# Patient Record
Sex: MALE | Race: WHITE | URBAN - METROPOLITAN AREA
[De-identification: names, ages, dates, MRNs, and addresses within clinical notes are randomized per-mention and may not be internally consistent; named-entity substitution may affect disease eponyms.]

---

## 2020-11-04 ENCOUNTER — APPOINTMENT (OUTPATIENT)
Dept: OTHER | Facility: HOSPITAL | Age: 35
End: 2020-11-04
Attending: EMERGENCY MEDICINE

## 2021-10-26 DIAGNOSIS — Z00.00 ROUTINE GENERAL MEDICAL EXAMINATION AT A HEALTH CARE FACILITY: Primary | ICD-10-CM

## 2021-10-28 ENCOUNTER — LAB ENCOUNTER (OUTPATIENT)
Dept: LAB | Facility: HOSPITAL | Age: 36
End: 2021-10-28
Attending: PREVENTIVE MEDICINE

## 2021-10-28 DIAGNOSIS — Z00.00 ROUTINE GENERAL MEDICAL EXAMINATION AT A HEALTH CARE FACILITY: ICD-10-CM

## 2021-11-01 ENCOUNTER — LAB ENCOUNTER (OUTPATIENT)
Dept: LAB | Facility: HOSPITAL | Age: 36
End: 2021-11-01
Attending: PREVENTIVE MEDICINE

## 2021-11-01 DIAGNOSIS — Z00.00 ROUTINE GENERAL MEDICAL EXAMINATION AT A HEALTH CARE FACILITY: ICD-10-CM

## 2023-11-27 ENCOUNTER — HOSPITAL ENCOUNTER (OUTPATIENT)
Age: 38
Discharge: HOME | End: 2023-11-27
Payer: COMMERCIAL

## 2023-11-27 VITALS
TEMPERATURE: 100 F | DIASTOLIC BLOOD PRESSURE: 79 MMHG | BODY MASS INDEX: 29.8 KG/M2 | HEIGHT: 72 IN | OXYGEN SATURATION: 97 % | RESPIRATION RATE: 20 BRPM | HEART RATE: 94 BPM | SYSTOLIC BLOOD PRESSURE: 112 MMHG | WEIGHT: 220 LBS

## 2023-11-27 DIAGNOSIS — U07.1 COVID-19: Primary | ICD-10-CM

## 2023-11-27 LAB
POCT INFLUENZA A: NEGATIVE
POCT INFLUENZA B: NEGATIVE
S PYO AG THROAT QL: NEGATIVE
SARS-COV-2 RNA RESP QL NAA+PROBE: DETECTED

## 2023-11-27 PROCEDURE — 87880 STREP A ASSAY W/OPTIC: CPT | Performed by: PHYSICIAN ASSISTANT

## 2023-11-27 PROCEDURE — U0002 COVID-19 LAB TEST NON-CDC: HCPCS | Performed by: PHYSICIAN ASSISTANT

## 2023-11-27 PROCEDURE — 99203 OFFICE O/P NEW LOW 30 MIN: CPT | Performed by: PHYSICIAN ASSISTANT

## 2023-11-27 PROCEDURE — 87502 INFLUENZA DNA AMP PROBE: CPT | Performed by: PHYSICIAN ASSISTANT

## 2023-11-27 NOTE — ED INITIAL ASSESSMENT (HPI)
Pt began 3 days ago and then yesterday he developed a bad cough, fatigue, and severe sinus congestion

## 2024-04-10 ENCOUNTER — APPOINTMENT (OUTPATIENT)
Dept: GENERAL RADIOLOGY | Age: 39
End: 2024-04-10
Attending: NURSE PRACTITIONER
Payer: COMMERCIAL

## 2024-04-10 ENCOUNTER — HOSPITAL ENCOUNTER (OUTPATIENT)
Age: 39
Discharge: HOME | End: 2024-04-10
Payer: COMMERCIAL

## 2024-04-10 VITALS
HEIGHT: 72 IN | DIASTOLIC BLOOD PRESSURE: 77 MMHG | SYSTOLIC BLOOD PRESSURE: 131 MMHG | BODY MASS INDEX: 30.48 KG/M2 | TEMPERATURE: 99 F | HEART RATE: 100 BPM | RESPIRATION RATE: 24 BRPM | OXYGEN SATURATION: 97 % | WEIGHT: 225 LBS

## 2024-04-10 DIAGNOSIS — R05.1 ACUTE COUGH: Primary | ICD-10-CM

## 2024-04-10 DIAGNOSIS — J11.1 INFLUENZA: ICD-10-CM

## 2024-04-10 DIAGNOSIS — J06.9 VIRAL UPPER RESPIRATORY TRACT INFECTION: ICD-10-CM

## 2024-04-10 LAB
POCT INFLUENZA A: POSITIVE
POCT INFLUENZA B: NEGATIVE

## 2024-04-10 PROCEDURE — 99213 OFFICE O/P EST LOW 20 MIN: CPT | Performed by: NURSE PRACTITIONER

## 2024-04-10 PROCEDURE — 87502 INFLUENZA DNA AMP PROBE: CPT | Performed by: NURSE PRACTITIONER

## 2024-04-10 PROCEDURE — 71046 X-RAY EXAM CHEST 2 VIEWS: CPT | Performed by: NURSE PRACTITIONER

## 2024-04-10 RX ORDER — BENZONATATE 100 MG/1
100 CAPSULE ORAL 3 TIMES DAILY PRN
Qty: 30 CAPSULE | Refills: 0 | Status: SHIPPED | OUTPATIENT
Start: 2024-04-10 | End: 2024-05-10

## 2024-04-10 RX ORDER — OSELTAMIVIR PHOSPHATE 75 MG/1
75 CAPSULE ORAL 2 TIMES DAILY
Qty: 10 CAPSULE | Refills: 0 | Status: SHIPPED | OUTPATIENT
Start: 2024-04-10 | End: 2024-04-15

## 2024-04-10 RX ORDER — ALBUTEROL SULFATE 90 UG/1
2 AEROSOL, METERED RESPIRATORY (INHALATION) EVERY 4 HOURS PRN
Qty: 1 EACH | Refills: 0 | Status: SHIPPED | OUTPATIENT
Start: 2024-04-10 | End: 2024-05-10

## 2024-04-10 RX ORDER — METHYLPREDNISOLONE 4 MG/1
TABLET ORAL
Qty: 1 EACH | Refills: 0 | Status: SHIPPED | OUTPATIENT
Start: 2024-04-10

## 2024-04-10 NOTE — ED PROVIDER NOTES
Patient Seen in: Immediate Care [facility]      History     Chief Complaint   Patient presents with    Chest Pain     With cough - Entered by patient    Cough/URI     Stated Complaint: Chest Pain - With cough x this am    Subjective:   HPI    39-year-old male presents today with complaints of cough, body aches and lethargy that started this morning.  Patient states he has not taken anything for symptoms.  Patient denies any known flu or pneumonia exposure.  Patient has declined COVID testing at this time.    Objective:   History reviewed. No pertinent past medical history.           History reviewed. No pertinent surgical history.             Social History     Socioeconomic History    Marital status:    Tobacco Use    Smoking status: Never   Vaping Use    Vaping status: Never Used   Substance and Sexual Activity    Alcohol use: Yes     Alcohol/week: 1.0 - 2.0 standard drink of alcohol     Types: 1 - 2 Standard drinks or equivalent per week     Comment: socially    Drug use: No    Sexual activity: Yes     Partners: Female     Comment:               Review of Systems   Constitutional:  Positive for fatigue.   HENT: Negative.     Eyes: Negative.    Respiratory:  Positive for cough.    Cardiovascular:         Chest discomfort with coughing and deep inspiration.   Gastrointestinal: Negative.    Endocrine: Negative.    Genitourinary: Negative.    Musculoskeletal: Negative.    Skin: Negative.    Allergic/Immunologic: Negative.    Neurological: Negative.    Hematological: Negative.    Psychiatric/Behavioral: Negative.         Positive for stated complaint: Chest Pain - With cough x this am  Other systems are as noted in HPI.  Constitutional and vital signs reviewed.      All other systems reviewed and negative except as noted above.    Physical Exam     ED Triage Vitals [04/10/24 1541]   /77   Pulse 100   Resp 24   Temp 99.2 °F (37.3 °C)   Temp src Temporal   SpO2 97 %   O2 Device None (Room  air)       Current:/77   Pulse 100   Temp 99.2 °F (37.3 °C) (Temporal)   Resp 24   Ht 182.9 cm (6')   Wt 102.1 kg   SpO2 97%   BMI 30.52 kg/m²         Physical Exam  Vitals reviewed.   Constitutional:       Appearance: He is well-developed and normal weight.   HENT:      Head: Normocephalic.   Eyes:      Extraocular Movements: Extraocular movements intact.      Pupils: Pupils are equal, round, and reactive to light.   Cardiovascular:      Rate and Rhythm: Normal rate and regular rhythm.      Heart sounds: Normal heart sounds.   Pulmonary:      Effort: Pulmonary effort is normal.      Breath sounds: Normal breath sounds.   Musculoskeletal:      Cervical back: Normal range of motion and neck supple.   Neurological:      General: No focal deficit present.      Mental Status: He is alert.   Psychiatric:         Mood and Affect: Mood normal.             ED Course     Labs Reviewed   POCT FLU TEST - Abnormal; Notable for the following components:       Result Value    POCT INFLUENZA A Positive (*)     All other components within normal limits    Narrative:     This assay is a rapid molecular in vitro test utilizing nucleic acid amplification of influenza A and B viral RNA.                      MDM        39-year-old male presents today with complaints of cough, body aches and lethargy that started this morning.  Patient states he has not taken anything for symptoms.  Patient denies any known flu or pneumonia exposure.  Patient has declined COVID testing at this time.  Vital signs: Please see EMR.  Physical exam: Please see exam.  Differential diagnosis: Pneumonia, bronchitis, influenza, COVID.  Recent Results (from the past 24 hour(s))   POCT Flu Test    Collection Time: 04/10/24  3:46 PM    Specimen: Nares; Other   Result Value Ref Range    POCT INFLUENZA A Positive (A) Negative    POCT INFLUENZA B Negative Negative       XR CHEST PA + LAT CHEST (CPT=71046)    Result Date: 4/10/2024  CONCLUSION:  See above.    LOCATION:  [id number]   Dictated by (CST): James Chao MD on 4/10/2024 at 3:57 PM     Finalized by (CST): James Chao MD on 4/10/2024 at 3:57 PM      Will diagnose with upper respiratory tract infection and flu.  Will prescribe Medrol Dosepak, Tessalon and ProAir.  Patient is to follow-up with primary care provider within 2 to 3 days.  ED precautions given.  Note to Patient  The 21st Century Cures Act makes medical notes like these available to patients in the interest of transparency. However, be advised this is a medical document and is intended as flmf-mu-vilj communication; it is written in medical language and may appear blunt, direct, or contain abbreviations or verbiage that are unfamiliar. Medical documents are intended to carry relevant information, facts as evident, and the clinical opinion of the practitioner.     This report has been produced using speech recognition software, and may contain errors related to grammar, punctuation, spelling, words or phrases unrecognized or not translated appropriately to text; these errors may be referred to the dictating provider for further clarification and/or addendum as needed.                                     Medical Decision Making    39-year-old male presents today with complaints of cough, body aches and lethargy that started this morning.  Patient states he has not taken anything for symptoms.  Patient denies any known flu or pneumonia exposure.  Patient has declined COVID testing at this time.    Problems Addressed:  Acute cough: acute illness or injury  Viral upper respiratory tract infection: acute illness or injury    Amount and/or Complexity of Data Reviewed  Labs: ordered. Decision-making details documented in ED Course.  Radiology: ordered. Decision-making details documented in ED Course.    Risk  Prescription drug management.        Disposition and Plan     Clinical Impression:  1. Acute cough    2. Viral upper respiratory tract infection    3.  Influenza         Disposition:  Discharge  4/10/2024  3:59 pm    Follow-up:  Roverto Randhawa MD    In 1 week  Urgent care follow up          Medications Prescribed:  Current Discharge Medication List        START taking these medications    Details   methylPREDNISolone (MEDROL) 4 MG Oral Tablet Therapy Pack Dosepack: take as directed  Qty: 1 each, Refills: 0      benzonatate 100 MG Oral Cap Take 1 capsule (100 mg total) by mouth 3 (three) times daily as needed for cough.  Qty: 30 capsule, Refills: 0      albuterol 108 (90 Base) MCG/ACT Inhalation Aero Soln Inhale 2 puffs into the lungs every 4 (four) hours as needed for Wheezing.  Qty: 1 each, Refills: 0      oseltamivir (TAMIFLU) 75 MG Oral Cap Take 1 capsule (75 mg total) by mouth 2 (two) times daily for 5 days.  Qty: 10 capsule, Refills: 0

## 2024-05-29 ENCOUNTER — HOSPITAL ENCOUNTER (OUTPATIENT)
Age: 39
Discharge: HOME | End: 2024-05-29

## 2024-05-29 VITALS
HEART RATE: 70 BPM | OXYGEN SATURATION: 97 % | SYSTOLIC BLOOD PRESSURE: 138 MMHG | TEMPERATURE: 98 F | RESPIRATION RATE: 18 BRPM | DIASTOLIC BLOOD PRESSURE: 84 MMHG

## 2024-05-29 DIAGNOSIS — H60.501 ACUTE OTITIS EXTERNA OF RIGHT EAR, UNSPECIFIED TYPE: Primary | ICD-10-CM

## 2024-05-29 PROCEDURE — 99213 OFFICE O/P EST LOW 20 MIN: CPT | Performed by: NURSE PRACTITIONER

## 2024-05-29 RX ORDER — NEOMYCIN SULFATE, POLYMYXIN B SULFATE AND HYDROCORTISONE 10; 3.5; 1 MG/ML; MG/ML; [USP'U]/ML
3 SUSPENSION/ DROPS AURICULAR (OTIC) ONCE
Status: COMPLETED | OUTPATIENT
Start: 2024-05-29 | End: 2024-05-29

## 2024-05-29 RX ORDER — AMOXICILLIN AND CLAVULANATE POTASSIUM 875; 125 MG/1; MG/1
1 TABLET, FILM COATED ORAL 2 TIMES DAILY
Qty: 14 TABLET | Refills: 0 | Status: SHIPPED | OUTPATIENT
Start: 2024-05-29 | End: 2024-05-31

## 2024-05-29 NOTE — DISCHARGE INSTRUCTIONS
Apply Polytrim drops: 4 drops 4 times a day for 7 days.   Augmentin: 1 tab twice a day for 7 days.   Do not get ear wet.   Tylenol/Ibuprofen for pain.   ENT referral provided; please call to schedule.   Return to Immediate care in 2 days if unable to be seen by PCP or ENT for re-evaluation.

## 2024-05-29 NOTE — ED INITIAL ASSESSMENT (HPI)
PT c/o right ear pain since Saturday, now pain radiating down through jaw, pain worse w/ eating, denies fever, denies congestion

## 2024-05-29 NOTE — ED PROVIDER NOTES
Patient Seen in: Immediate Care [facility]      History     Chief Complaint   Patient presents with    Ear Problem Pain     Stated Complaint: right ear pain    Subjective:   40 yo male presents with complaint of right ear pain and muffled hearing that began 4 days ago.   Pt states pain radiates from ear to right upper jaw.   States symptoms began after getting water in his ear while showering. Also wears ear buds.   No recent dental work or tooth pain.   Pt denies fever, chills, nasal congestion, sore throat, dizziness, headache, nausea, vomiting or diarrhea.     The history is provided by the patient.           Objective:   History reviewed. No pertinent past medical history.           History reviewed. No pertinent surgical history.             Social History     Socioeconomic History    Marital status:    Tobacco Use    Smoking status: Never   Vaping Use    Vaping status: Never Used   Substance and Sexual Activity    Alcohol use: Yes     Alcohol/week: 1.0 - 2.0 standard drink of alcohol     Types: 1 - 2 Standard drinks or equivalent per week     Comment: socially    Drug use: No    Sexual activity: Yes     Partners: Female     Comment:               Review of Systems    Positive for stated complaint: right ear pain  Other systems are as noted in HPI.  Constitutional and vital signs reviewed.      All other systems reviewed and negative except as noted above.    Physical Exam     ED Triage Vitals [05/29/24 0816]   /84   Pulse 70   Resp 18   Temp 98.4 °F (36.9 °C)   Temp src Oral   SpO2 97 %   O2 Device None (Room air)       Current Vitals:   Vital Signs  BP: 138/84  Pulse: 70  Resp: 18  Temp: 98.4 °F (36.9 °C)  Temp src: Oral    Oxygen Therapy  SpO2: 97 %  O2 Device: None (Room air)            Physical Exam  Constitutional:       Appearance: Normal appearance.   HENT:      Left Ear: Tympanic membrane and external ear normal. Tenderness (right canal with tenderness, edema and diffuse  erythema. Ear wick placed.) present. No drainage. No mastoid tenderness.      Nose: Nose normal.      Mouth/Throat:      Mouth: Mucous membranes are moist.      Dentition: Normal dentition. No dental tenderness.      Pharynx: No posterior oropharyngeal erythema.   Eyes:      Conjunctiva/sclera: Conjunctivae normal.   Cardiovascular:      Rate and Rhythm: Normal rate and regular rhythm.   Pulmonary:      Effort: Pulmonary effort is normal.      Breath sounds: Normal breath sounds.   Neurological:      Mental Status: He is alert.               ED Course   Labs Reviewed - No data to display  Placed ear wick to right ear canal. Polytrim drops applied in clinic.                  [facility]                 Medical Decision Making  39-year-old male presents with complaint of right ear pain x 4 days.  Differentials include: otitis media vs otitis externa vs ETD vs mastoiditis.   On exam patient is well-appearing with normal vitals; afebrile.  HPI and exam consistent with otitis externa. Right ear canal with erythema and swelling; tiny area of visibility to TM. No mastoid tenderness bilaterally.  Ear wick placed and Polytrim drops applied in clinic. Will start on Augmentin BID x 7 days.  Keep ear dry. Supportive care discussed. ENT referral provided. If unable to be seen in 2 days, return to Immediate Care for removal of wick and re-evaluation.    Patient/parent verbalized understanding and agreeable to plan of care.      Amount and/or Complexity of Data Reviewed  External Data Reviewed: notes.    Risk  OTC drugs.  Prescription drug management.        Disposition and Plan     Clinical Impression:  1. Acute otitis externa of right ear, unspecified type         Disposition:  Discharge  5/29/2024  8:32 am    Follow-up:  [facility], [facility]  [address] [facility] [address]  [phone]  Schedule an appointment as soon as possible for a visit       Ferny Harper MD  [address]  [facility] [address]  [phone]                Medications Prescribed:  Discharge Medication List as of 5/29/2024  8:32 AM

## 2024-05-31 ENCOUNTER — HOSPITAL ENCOUNTER (OUTPATIENT)
Age: 39
Discharge: HOME | End: 2024-05-31
Payer: COMMERCIAL

## 2024-05-31 VITALS
HEART RATE: 70 BPM | OXYGEN SATURATION: 97 % | TEMPERATURE: 98 F | SYSTOLIC BLOOD PRESSURE: 127 MMHG | DIASTOLIC BLOOD PRESSURE: 77 MMHG | RESPIRATION RATE: 18 BRPM

## 2024-05-31 DIAGNOSIS — H60.501 ACUTE OTITIS EXTERNA OF RIGHT EAR, UNSPECIFIED TYPE: Primary | ICD-10-CM

## 2024-05-31 DIAGNOSIS — H65.01 RIGHT ACUTE SEROUS OTITIS MEDIA, RECURRENCE NOT SPECIFIED: ICD-10-CM

## 2024-05-31 PROCEDURE — 99213 OFFICE O/P EST LOW 20 MIN: CPT | Performed by: NURSE PRACTITIONER

## 2024-05-31 RX ORDER — AMOXICILLIN AND CLAVULANATE POTASSIUM 875; 125 MG/1; MG/1
1 TABLET, FILM COATED ORAL 2 TIMES DAILY
Qty: 6 TABLET | Refills: 0 | Status: SHIPPED | OUTPATIENT
Start: 2024-05-31 | End: 2024-06-03

## 2024-05-31 NOTE — ED INITIAL ASSESSMENT (HPI)
Pt request follow up care for ear wick placed in right ear a few days ago at [facility], rpt feeling some improvement

## 2024-05-31 NOTE — DISCHARGE INSTRUCTIONS
Please finish course of oral antibiotics and drops.  Follow-up with ENT if pain continues as discussed.

## 2024-05-31 NOTE — ED PROVIDER NOTES
Patient Seen in: Immediate Care [facility]      History     Chief Complaint   Patient presents with    Ear Problem Pain     Stated Complaint: ear pain (right)    Subjective:   This a 39-year-old male with no significant past medical history.  Presents to immediate care for reevaluation for right ear pain.  Was diagnosed with otitis media and otitis externa 2 days ago.  Ear wick was placed.  He reports ear wick fell out.  Reports improvement in symptoms.  Hearing is less muffled.  No fevers.  Nothing is draining from the ear canal.    The history is provided by the patient.           Objective:   History reviewed. No pertinent past medical history.           History reviewed. No pertinent surgical history.             Social History     Socioeconomic History    Marital status:    Tobacco Use    Smoking status: Never   Vaping Use    Vaping status: Never Used   Substance and Sexual Activity    Alcohol use: Yes     Alcohol/week: 1.0 - 2.0 standard drink of alcohol     Types: 1 - 2 Standard drinks or equivalent per week     Comment: socially    Drug use: No    Sexual activity: Yes     Partners: Female     Comment:               Review of Systems   Constitutional:  Negative for chills and fever.   HENT:  Positive for ear pain. Negative for congestion, ear discharge and sore throat.    Respiratory:  Negative for cough, shortness of breath and wheezing.    Cardiovascular:  Negative for chest pain.   Gastrointestinal:  Negative for abdominal pain, constipation, diarrhea, nausea and vomiting.   Genitourinary:  Negative for dysuria.   Musculoskeletal:  Negative for back pain, neck pain and neck stiffness.   Skin:  Negative for rash.   Allergic/Immunologic: Negative for environmental allergies.   Neurological:  Negative for headaches.   Hematological:  Does not bruise/bleed easily.       Positive for stated complaint: ear pain (right)  Other systems are as noted in HPI.  Constitutional and vital signs  reviewed.      All other systems reviewed and negative except as noted above.    Physical Exam     ED Triage Vitals [05/31/24 0817]   /77   Pulse 70   Resp 18   Temp 98.4 °F (36.9 °C)   Temp src Oral   SpO2 97 %   O2 Device None (Room air)       Current Vitals:   Vital Signs  BP: 127/77  Pulse: 70  Resp: 18  Temp: 98.4 °F (36.9 °C)  Temp src: Oral    Oxygen Therapy  SpO2: 97 %  O2 Device: None (Room air)            Physical Exam  Vitals and nursing note reviewed.   Constitutional:       General: He is not in acute distress.     Appearance: Normal appearance. He is not ill-appearing, toxic-appearing or diaphoretic.   HENT:      Head: Normocephalic and atraumatic.      Right Ear: External ear normal. Swelling and tenderness present. There is no impacted cerumen. No foreign body. No mastoid tenderness. Tympanic membrane is injected and retracted. Tympanic membrane is not scarred, perforated, erythematous or bulging.      Left Ear: Hearing, tympanic membrane, ear canal and external ear normal. No swelling or tenderness. There is no impacted cerumen. No foreign body. No mastoid tenderness. Tympanic membrane is not injected, scarred, perforated, erythematous, retracted or bulging.      Nose: Nose normal.      Mouth/Throat:      Mouth: Mucous membranes are moist.      Pharynx: Oropharynx is clear.   Eyes:      General:         Right eye: No discharge.         Left eye: No discharge.      Extraocular Movements: Extraocular movements intact.      Conjunctiva/sclera: Conjunctivae normal.   Cardiovascular:      Rate and Rhythm: Normal rate.   Pulmonary:      Effort: Pulmonary effort is normal.   Musculoskeletal:      Cervical back: Neck supple.      Right lower leg: No edema.      Left lower leg: No edema.   Skin:     General: Skin is warm and dry.      Capillary Refill: Capillary refill takes less than 2 seconds.      Findings: No rash.   Neurological:      Mental Status: He is alert and oriented to person, place, and  time.   Psychiatric:         Mood and Affect: Mood normal.         Behavior: Behavior normal.               ED Course   Labs Reviewed - No data to display          DC home         MDM        Vital signs stable.  Patient is well-appearing and nontoxic looking.  Presents to immediate care for ear pain.    Differential diagnosis includes but is not limited to otitis externa, otitis media, media effusion, cerumen impaction, less likely mastoiditis    Ear wick fell out inadvertently.  Right ear canal is swollen with tragal tenderness.  TM is intact, erythemic, and retracted.  No mastoid tenderness bilaterally.    Exam is consistent with otitis externa and otitis media.    DC home.  Continue eardrops and oral antibiotics.  Discussed no swimming during treatment.  Nothing should be placed in the ear canal including Q-tips.  Tylenol and ibuprofen for pain.  ENT follow-up if pain continues.  Patient  verbalized understanding, and agreed plan of care.  All questions answered.                                      Medical Decision Making      Disposition and Plan     Clinical Impression:  1. Acute otitis externa of right ear, unspecified type    2. Right acute serous otitis media, recurrence not specified         Disposition:  Discharge  5/31/2024  8:26 am    Follow-up:  Cole Dang MD  [address] [facility] [address]  [phone]    In 1 week            Medications Prescribed:  Discharge Medication List as of 5/31/2024  8:28 AM

## 2024-08-01 ENCOUNTER — HOSPITAL ENCOUNTER (OUTPATIENT)
Age: 39
Discharge: HOME | End: 2024-08-01
Payer: COMMERCIAL

## 2024-08-01 VITALS
HEIGHT: 72 IN | SYSTOLIC BLOOD PRESSURE: 119 MMHG | RESPIRATION RATE: 20 BRPM | WEIGHT: 220 LBS | OXYGEN SATURATION: 97 % | BODY MASS INDEX: 29.8 KG/M2 | DIASTOLIC BLOOD PRESSURE: 79 MMHG | TEMPERATURE: 98 F | HEART RATE: 71 BPM

## 2024-08-01 DIAGNOSIS — H66.002 ACUTE SUPPURATIVE OTITIS MEDIA OF LEFT EAR WITHOUT SPONTANEOUS RUPTURE OF TYMPANIC MEMBRANE, RECURRENCE NOT SPECIFIED: ICD-10-CM

## 2024-08-01 DIAGNOSIS — H60.502 ACUTE OTITIS EXTERNA OF LEFT EAR, UNSPECIFIED TYPE: Primary | ICD-10-CM

## 2024-08-01 PROCEDURE — 99213 OFFICE O/P EST LOW 20 MIN: CPT | Performed by: PHYSICIAN ASSISTANT

## 2024-08-01 RX ORDER — DEXAMETHASONE 4 MG/1
4 TABLET ORAL ONCE
Status: COMPLETED | OUTPATIENT
Start: 2024-08-01 | End: 2024-08-01

## 2024-08-01 RX ORDER — NEOMYCIN SULFATE, POLYMYXIN B SULFATE, HYDROCORTISONE 3.5; 10000; 1 MG/ML; [USP'U]/ML; MG/ML
3 SOLUTION/ DROPS AURICULAR (OTIC) 3 TIMES DAILY
Qty: 1 EACH | Refills: 0 | Status: SHIPPED | OUTPATIENT
Start: 2024-08-01 | End: 2024-08-11

## 2024-08-01 RX ORDER — AMOXICILLIN 875 MG/1
875 TABLET, COATED ORAL 2 TIMES DAILY
Qty: 20 TABLET | Refills: 0 | Status: SHIPPED | OUTPATIENT
Start: 2024-08-01 | End: 2024-08-11

## 2024-08-01 NOTE — DISCHARGE INSTRUCTIONS
Please return to the ER/clinic if symptoms worsen. Follow-up with your PCP in 24-48 hours as needed.    Take the full course of antibiotics as prescribed.  Take Motrin and or Tylenol for discomfort.  The Decadron will work in your system the next several days.  Recommend treating both the ears.  Recommend taking an over the counter antihistamine daily: IE zyrtec/claritin.  Push fluids and gargle with warm saline rinses.   If symptoms persist or worsen make a follow-up appointment with ENT for further evaluation and treatment.

## 2024-08-01 NOTE — ED PROVIDER NOTES
Patient Seen in: Immediate Care [facility]      History     Chief Complaint   Patient presents with    Ear Problem     Entered by patient    Ear Problem Pain     Stated Complaint: Ear Problem    Subjective:   HPI    39-year-old male here with complaint of left ear pain for the past few days.  Patient reports that it feels swollen and throbbing.  Patient reports he may have gotten water etc. in his ear.  Patient denies chest pain, shortness of breath, cough, abdominal pain, nausea, vomiting or diarrhea.  Afebrile.      Objective:   History reviewed. No pertinent past medical history.           History reviewed. No pertinent surgical history.           The patient's medication list, past medical history and social history elements  as listed in today's nurse's notes are reviewed and agree.   The patient's family history is reviewed and is noncontributory to the presenting problem, except as indicated as above.     Social History     Socioeconomic History    Marital status:    Tobacco Use    Smoking status: Never   Vaping Use    Vaping status: Never Used   Substance and Sexual Activity    Alcohol use: Yes     Alcohol/week: 1.0 - 2.0 standard drink of alcohol     Types: 1 - 2 Standard drinks or equivalent per week     Comment: socially    Drug use: No    Sexual activity: Yes     Partners: Female     Comment:               Review of Systems    Positive for stated Chief Complaint: Ear Problem (Entered by patient) and Ear Problem Pain    Other systems are as noted in HPI.  Constitutional and vital signs reviewed.      All other systems reviewed and negative except as noted above.    Physical Exam     ED Triage Vitals [08/01/24 0828]   /79   Pulse 71   Resp 20   Temp 97.7 °F (36.5 °C)   Temp src Temporal   SpO2 97 %   O2 Device None (Room air)       Current Vitals:   Vital Signs  BP: 119/79  Pulse: 71  Resp: 20  Temp: 97.7 °F (36.5 °C)  Temp src: Temporal    Oxygen Therapy  SpO2: 97 %  O2 Device:  None (Room air)            Physical Exam  Vitals and nursing note reviewed.   Constitutional:       Appearance: Normal appearance. He is well-developed.   HENT:      Head: Normocephalic.      Jaw: There is normal jaw occlusion.      Right Ear: External ear normal. Tympanic membrane is bulging.      Left Ear: External ear normal. Swelling and tenderness present. Tympanic membrane is injected, erythematous and bulging.      Nose: Rhinorrhea present. Rhinorrhea is clear.      Mouth/Throat:      Lips: Pink.      Mouth: Mucous membranes are moist.      Pharynx: Oropharynx is clear.      Comments: Uvula midline: No trismus or drooling: No peritonsillar abscess noted moderate cobblestoning in the posterior pharynx.  Eyes:      Conjunctiva/sclera: Conjunctivae normal.      Pupils: Pupils are equal, round, and reactive to light.   Cardiovascular:      Rate and Rhythm: Normal rate and regular rhythm.      Heart sounds: Normal heart sounds.   Pulmonary:      Effort: Pulmonary effort is normal.      Breath sounds: Normal breath sounds.   Musculoskeletal:      Cervical back: Normal range of motion and neck supple.   Skin:     General: Skin is warm.      Capillary Refill: Capillary refill takes less than 2 seconds.   Neurological:      General: No focal deficit present.      Mental Status: He is alert and oriented to person, place, and time.   Psychiatric:         Mood and Affect: Mood normal.         Behavior: Behavior normal.         Thought Content: Thought content normal.         Judgment: Judgment normal.           ED Course                      MDM   Clinical Impression: L otitis externa/media  Course of Treatment:   Take the full course of antibiotics as prescribed.  Take Motrin and or Tylenol for discomfort.  The Decadron will work in your system the next several days.  Recommend treating both the ears.  Recommend taking an over the counter antihistamine daily: IE zyrtec/claritin.  Push fluids and gargle with warm saline  rinses.   If symptoms persist or worsen make a follow-up appointment with ENT for further evaluation and treatment.    The patient is encouraged to return if any concerning symptoms arise. Additional verbal discharge instructions are given and discussed. Discharge medications are discussed. The patient is in good condition throughout the visit today and remains so upon discharge. I discuss the plan of care with the patient, who expresses understanding. All questions and concerns are addressed to the patient's satisfaction prior to discharge today.  Previous conversations with PCP and charts were reviewed.                                           Disposition and Plan     Clinical Impression:  1. Acute otitis externa of left ear, unspecified type    2. Acute suppurative otitis media of left ear without spontaneous rupture of tympanic membrane, recurrence not specified         Disposition:  Discharge  8/1/2024  8:47 am    Follow-up:  [facility], [address] [address] [address] [facility] [address]  [facility] [id number] [phone]        Ferny Harper MD  [address] [facility] [address] [phone]                Medications Prescribed:  Current Discharge Medication List        START taking these medications    Details   Neomycin-Polymyxin-HC 1 % Otic Solution Place 3 drops in ear(s) 3 (three) times daily for 10 days.  Qty: 1 each, Refills: 0      amoxicillin 875 MG Oral Tab Take 1 tablet (875 mg total) by mouth 2 (two) times daily for 10 days.  Qty: 20 tablet, Refills: 0

## 2024-08-11 ENCOUNTER — HOSPITAL ENCOUNTER (OUTPATIENT)
Age: 39
Discharge: HOME | End: 2024-08-11
Payer: COMMERCIAL

## 2024-08-11 VITALS
HEIGHT: 72 IN | SYSTOLIC BLOOD PRESSURE: 128 MMHG | BODY MASS INDEX: 29.8 KG/M2 | OXYGEN SATURATION: 97 % | DIASTOLIC BLOOD PRESSURE: 83 MMHG | HEART RATE: 98 BPM | TEMPERATURE: 99 F | RESPIRATION RATE: 18 BRPM | WEIGHT: 220 LBS

## 2024-08-11 DIAGNOSIS — J06.9 VIRAL URI: Primary | ICD-10-CM

## 2024-08-11 LAB
S PYO AG THROAT QL: NEGATIVE
SARS-COV-2 RNA RESP QL NAA+PROBE: NOT DETECTED

## 2024-08-11 RX ORDER — BENZONATATE 100 MG/1
100 CAPSULE ORAL 3 TIMES DAILY PRN
Qty: 15 CAPSULE | Refills: 0 | Status: SHIPPED | OUTPATIENT
Start: 2024-08-11

## 2024-08-11 RX ORDER — PREDNISONE 20 MG/1
40 TABLET ORAL DAILY
Qty: 10 TABLET | Refills: 0 | Status: SHIPPED | OUTPATIENT
Start: 2024-08-11 | End: 2024-08-16

## 2024-08-11 NOTE — ED INITIAL ASSESSMENT (HPI)
Pt c/o cough , sore throat, stuffy nose,and runny nose. Pt c/o chest pain when coughing. Pt c/o sinus pain and headache.  Pt c/o green sputum. Pt states symptoms started today.

## 2024-08-11 NOTE — ED PROVIDER NOTES
Patient Seen in: [facility]      History     Chief Complaint   Patient presents with    Cough/URI    Stuffy Nose    Sore Throat     Stated Complaint: Chest Pain - Cold like symptoms. Congestion, runny nose, cough    Subjective:   HPI    38 YO male presents to immediate care with one day history of nasal congestion with runny nose, nonproductive cough and sore throat.  Patient endorses chest discomfort only when coughing.  Denies SOB.  Denies fever.  NyQuil taken last night allowed sleep.         Objective:   History reviewed. No pertinent past medical history.           History reviewed. No pertinent surgical history.             Social History     Socioeconomic History    Marital status:    Tobacco Use    Smoking status: Never   Vaping Use    Vaping status: Never Used   Substance and Sexual Activity    Alcohol use: Yes     Alcohol/week: 1.0 - 2.0 standard drink of alcohol     Types: 1 - 2 Standard drinks or equivalent per week     Comment: socially    Drug use: No    Sexual activity: Yes     Partners: Female     Comment:               Review of Systems    Positive for stated Chief Complaint: Cough/URI, Stuffy Nose, and Sore Throat    Other systems are as noted in HPI.  Constitutional and vital signs reviewed.      All other systems reviewed and negative except as noted above.    Physical Exam     ED Triage Vitals [08/11/24 1041]   /83   Pulse 98   Resp 18   Temp 99.2 °F (37.3 °C)   Temp src Temporal   SpO2 97 %   O2 Device None (Room air)       Current Vitals:   Vital Signs  BP: 128/83  Pulse: 98  Resp: 18  Temp: 99.2 °F (37.3 °C)  Temp src: Temporal    Oxygen Therapy  SpO2: 97 %  O2 Device: None (Room air)            Physical Exam  Vitals and nursing note reviewed.   Constitutional:       General: He is not in acute distress.     Appearance: Normal appearance. He is not ill-appearing, toxic-appearing or diaphoretic.   HENT:      Right Ear: Tympanic membrane and ear canal  normal.      Left Ear: Tympanic membrane and ear canal normal.      Nose: Congestion present.      Mouth/Throat:      Mouth: Mucous membranes are moist.      Pharynx: Posterior oropharyngeal erythema present. No pharyngeal swelling or oropharyngeal exudate.   Cardiovascular:      Rate and Rhythm: Normal rate and regular rhythm.   Pulmonary:      Effort: Pulmonary effort is normal. No respiratory distress.      Breath sounds: Normal breath sounds. No wheezing.   Neurological:      Mental Status: He is alert and oriented to person, place, and time.   Psychiatric:         Mood and Affect: Mood normal.         Behavior: Behavior normal.         ED Course     Labs Reviewed   POCT RAPID STREP - Normal   RAPID SARS-COV-2 BY PCR - Normal       MDM      Differential diagnosis considered but not limited to COVID, other viral illness, strep pharyngitis    Physical exam as above.  Rapid strep negative.  COVID negative.  Likely other viral illness.  Short course of Prednisone and Tessalon Perles prescribed.  Return instructions discussed with understanding.        Medical Decision Making  Amount and/or Complexity of Data Reviewed  Labs: ordered. Decision-making details documented in ED Course.    Risk  Prescription drug management.        Disposition and Plan     Clinical Impression:  1. Viral URI         Disposition:  Discharge  8/11/2024 11:36 am    Follow-up:  [facility] [address] [address] [phone]    If symptoms worsen          Medications Prescribed:  Discharge Medication List as of 8/11/2024 11:37 AM        START taking these medications    Details   predniSONE 20 MG Oral Tab Take 2 tablets (40 mg total) by mouth daily for 5 days., Normal, Disp-10 tablet, R-0      benzonatate (TESSALON PERLES) 100 MG Oral Cap Take 1 capsule (100 mg total) by mouth 3 (three) times daily as needed for cough., Normal, Disp-15 capsule, R-0

## 2024-09-24 ENCOUNTER — OFFICE VISIT (OUTPATIENT)
Facility: LOCATION | Age: 39
End: 2024-09-24
Payer: COMMERCIAL

## 2024-09-24 DIAGNOSIS — H60.90 OTITIS EXTERNA, UNSPECIFIED CHRONICITY, UNSPECIFIED LATERALITY, UNSPECIFIED TYPE: Primary | ICD-10-CM

## 2024-09-24 PROCEDURE — 99204 OFFICE O/P NEW MOD 45 MIN: CPT | Performed by: OTOLARYNGOLOGY

## 2024-09-24 RX ORDER — HYDROCORTISONE AND ACETIC ACID 20.75; 10.375 MG/ML; MG/ML
4 SOLUTION AURICULAR (OTIC) 2 TIMES DAILY
Qty: 10 ML | Refills: 1 | Status: SHIPPED | OUTPATIENT
Start: 2024-09-24

## 2024-09-24 NOTE — PROGRESS NOTES
Gerardo Stern is a 39 year old male. No chief complaint on file.    HPI:   39-year-old white male began having what sounds like recurrent otitis externa's since the last 3 or 4 months.  He is a frequent Q-tip user the ears get itchy.  He has not taken up swimming or any water activities.  He is not diabetic and does not have hearing loss associated with this.  He has been to urgent care multiple times he is even required a ear wick.  Current Outpatient Medications   Medication Sig Dispense Refill    benzonatate (TESSALON PERLES) 100 MG Oral Cap Take 1 capsule (100 mg total) by mouth 3 (three) times daily as needed for cough. 15 capsule 0    methylPREDNISolone (MEDROL) 4 MG Oral Tablet Therapy Pack Dosepack: take as directed 1 each 0    Pimecrolimus (ELIDEL) 1 % External Cream Apply to affected area on perioral area BID 30 g 0    Flurandrenolide (CORDRAN) 0.05 % External Cream Apply to aa on the face bid for 1 month 15 g 0    tacrolimus 0.1 % External Ointment Apply to the aa on the perioral area BID 60 g 1      History reviewed. No pertinent past medical history.   Social History:  Social History     Socioeconomic History    Marital status:    Tobacco Use    Smoking status: Never   Vaping Use    Vaping status: Never Used   Substance and Sexual Activity    Alcohol use: Yes     Alcohol/week: 1.0 - 2.0 standard drink of alcohol     Types: 1 - 2 Standard drinks or equivalent per week     Comment: socially    Drug use: No    Sexual activity: Yes     Partners: Female     Comment:       History reviewed. No pertinent surgical history.      REVIEW OF SYSTEMS:   GENERAL HEALTH: feels well otherwise  GENERAL : denies fever, chills, sweats, weight loss, weight gain  SKIN: denies any unusual skin lesions or rashes  RESPIRATORY: denies shortness of breath with exertion  NEURO: denies headaches    EXAM:   There were no vitals taken for this visit.    System Pertinent findings Details   Constitutional  Overall  appearance - Normal.   Psychiatric  Orientation - Oriented to time, place, person & situation. Appropriate mood and affect.   Head/Face  Facial features -- Normal. Skull - Normal.   Eyes  Pupils equal ,round ,react to light and accomidate   Ears  External Ear Right: Normal, Left: Normal. Canal - Right: Normal, Left: Normal. TM - Right: Normal left: Normal   Nose  External Nose, Normal, Septum -midline nasal Vault, clear,Turbinates - Right: Normal left: Normal   Mouth/Throat  Lips/teeth/gums - Normal. Tonsils -1+. Oropharynx - Normal.   Neck Exam  Inspection - Normal. Palpation - Normal. Parotid gland - Normal. Thyroid gland -normal   Neurological  Cranial nerves - Cranial nerves II through XII grossly intact.   Nasopharynx   Normal        Lymph Detail  Submental. Submandibular. Anterior cervical. Posterior cervical. Supraclavicular.       ASSESSMENT AND PLAN:   1. Otitis externa, unspecified chronicity, unspecified laterality, unspecified type  VoSol HC otic drops use at first sign of itching or discomfort in the ear could use twice a day for 3 days or more if does not respond he would need to be seen either by PCP ear nose and throat or urgent care.      The patient indicates understanding of these issues and agrees to the plan.      Mu Castillo MD  9/24/2024  9:54 AM

## 2024-10-28 ENCOUNTER — OFFICE VISIT (OUTPATIENT)
Dept: INTERNAL MEDICINE CLINIC | Facility: CLINIC | Age: 39
End: 2024-10-28
Payer: COMMERCIAL

## 2024-10-28 VITALS
WEIGHT: 215.81 LBS | TEMPERATURE: 98 F | HEART RATE: 76 BPM | BODY MASS INDEX: 30.21 KG/M2 | DIASTOLIC BLOOD PRESSURE: 72 MMHG | RESPIRATION RATE: 12 BRPM | HEIGHT: 71 IN | SYSTOLIC BLOOD PRESSURE: 110 MMHG

## 2024-10-28 DIAGNOSIS — R10.84 GENERALIZED ABDOMINAL PAIN: Primary | ICD-10-CM

## 2024-10-28 DIAGNOSIS — K62.5 RECTAL BLEEDING: ICD-10-CM

## 2024-10-28 DIAGNOSIS — K21.9 GASTROESOPHAGEAL REFLUX DISEASE, UNSPECIFIED WHETHER ESOPHAGITIS PRESENT: ICD-10-CM

## 2024-10-28 DIAGNOSIS — R19.7 DIARRHEA, UNSPECIFIED TYPE: ICD-10-CM

## 2024-10-28 PROCEDURE — 3078F DIAST BP <80 MM HG: CPT | Performed by: INTERNAL MEDICINE

## 2024-10-28 PROCEDURE — 99204 OFFICE O/P NEW MOD 45 MIN: CPT | Performed by: INTERNAL MEDICINE

## 2024-10-28 PROCEDURE — 3074F SYST BP LT 130 MM HG: CPT | Performed by: INTERNAL MEDICINE

## 2024-10-28 PROCEDURE — G2211 COMPLEX E/M VISIT ADD ON: HCPCS | Performed by: INTERNAL MEDICINE

## 2024-10-28 PROCEDURE — 3008F BODY MASS INDEX DOCD: CPT | Performed by: INTERNAL MEDICINE

## 2024-10-28 RX ORDER — OXYCODONE AND ACETAMINOPHEN 5; 325 MG/1; MG/1
1 TABLET ORAL EVERY 6 HOURS PRN
COMMUNITY
Start: 2024-10-25

## 2024-10-28 RX ORDER — NICOTINE POLACRILEX 4 MG/1
1 GUM, CHEWING ORAL EVERY MORNING
Qty: 30 TABLET | Refills: 1 | Status: SHIPPED | OUTPATIENT
Start: 2024-10-28 | End: 2024-11-27

## 2024-10-28 NOTE — PROGRESS NOTES
[facility]    CHIEF COMPLAINT:    Chief Complaint   Patient presents with    Stomach Pain     Sx for past year.  On occasion experiences stomach pain with loose stools/diarrhea. Also has heartburn. Takes Tums.          HISTORY OF PRESENT ILLNESS:  The patient is a 39 year old year old male who presents with abdominal pain for a year. Off and on. Has fecal urgency and loose stools when he has this. Lasts for about a week or so and goes away but then recurs a few months later.   No nausea, no vomiting.   Has heart burn, takes tums almost every day for this. He stopped drinking coffee because it was triggering heartburn.   No abdominal pain today. When he has it it is generalized, feels uncomfortable.   Has had some rectal bleeding for about 5 months. Blood when he wipes and in the toilet bowl. No melena.   Normally has about 3 bm a day and when he has symptoms he still has about that number but the stools are looser.   No fever.   No weight loss.   No triggers, no increasing or decreasing factors.     He just had a cbc and cmp done 10/14/24 which revealed no worrisome findings.     He is scheduled for a foot surgery on 11/13/24.     Past Medical History:   Past Medical History:    COVID        Past Surgical History:   Past Surgical History:   Procedure Laterality Date    Other surgical history Right 2023    repair of torn supraspinatus  muscle    Vasectomy         Current Medications:    Current Outpatient Medications   Medication Sig Dispense Refill    Omeprazole 20 MG Oral Tab EC Take 1 tablet by mouth every morning. 30 tablet 1    oxyCODONE-acetaminophen 5-325 MG Oral Tab Take 1 tablet by mouth every 6 (six) hours as needed. (Patient not taking: Reported on 10/28/2024)      Hydrocortisone-Acetic Acid 1-2 % Otic Solution Place 4 drops into both ears 2 (two) times daily. (Patient not taking: Reported on 10/28/2024) 10 mL 1    Pimecrolimus (ELIDEL) 1 % External Cream Apply to affected area on perioral area  BID (Patient not taking: Reported on 10/28/2024) 30 g 0    Flurandrenolide (CORDRAN) 0.05 % External Cream Apply to aa on the face bid for 1 month (Patient not taking: Reported on 10/28/2024) 15 g 0         Allergies:    Allergies as of 10/28/2024    (No Known Allergies)       SOCIAL HISTORY:    Social History     Socioeconomic History    Marital status:      Spouse name: Not on file    Number of children: 2    Years of education: Not on file    Highest education level: Not on file   Occupational History    Occupation: [employer]   Tobacco Use    Smoking status: Never    Smokeless tobacco: Not on file   Vaping Use    Vaping status: Never Used   Substance and Sexual Activity    Alcohol use: Not Currently     Comment: approx 0-6 every couple of months    Drug use: No    Sexual activity: Yes     Partners: Female     Comment:    Other Topics Concern    Not on file   Social History Narrative    Not on file     Social Drivers of Health     Financial Resource Strain: Not on file   Food Insecurity: Not on file   Transportation Needs: Not on file   Physical Activity: Not on file   Stress: Not on file   Social Connections: Not on file   Housing Stability: Not on file       FAMILY HISTORY:   Family History   Problem Relation Age of Onset    Osteoporosis Mother     Cancer Father 60        bladder and kidney    Other (Other) Maternal Grandmother         Alzheimer's    Heart Disorder Maternal Grandfather     Diabetes Paternal Grandmother     Heart Disorder Paternal Grandfather        REVIEW OF SYSTEMS:  GENERAL: feels well otherwise  SKIN: denies any unusual skin lesions  EYES:denies blurred vision or double vision  HEENT: denies nasal congestion, sinus pain or ST  LUNGS: denies shortness of breath with exertion  CARDIOVASCULAR: denies chest pain on exertion  GI: see hpi  : denies dysuria  MUSCULOSKELETAL: denies back pain  HEMATOLOGIC: denies hx of anemia  ENDOCRINE: denies thyroid history  ALL/ASTHMA:  denies hx of allergy or asthma          PAIN ASSESSMENT (Patient reports Pain):none today.        FALL ASSESSMENT:    Falls in the last 12 months: No    FUNCTIONAL ASSESSMENT (Able to perform all ADLs):  Yes    BODY HABITUS AND NUTRITION STATUS:  Body mass index is 30.1 kg/m².    PHYSICAL EXAM:   /72 (BP Location: Right arm, Patient Position: Sitting, Cuff Size: large)   Pulse 76   Temp 97.9 °F (36.6 °C) (Temporal)   Resp 12   Ht 5' 11\" (1.803 m)   Wt 215 lb 12.8 oz (97.9 kg)   BMI 30.10 kg/m²  Body mass index is 30.1 kg/m².   GENERAL: well developed, well nourished,in no apparent distress  LUNGS: clear to auscultation  CARDIO: RRR without murmur  GI: normal bowel sounds, abdomen is soft, no tenderness, no hsm.   RECTAL: anal exam with no external or inflamed hemorrhoid  MUSCULOSKELETAL:  no edema, muscle strength normal.     Labs:   No results found for: \"WBC\", \"HGB\", \"PLT\"   No results found for: \"GLU\", \"NA\", \"K\", \"CL\", \"CO2\", \"CREATSERUM\", \"CA\", \"ALB\", \"TP\", \"ALKPHO\", \"AST\", \"ALT\", \"BILT\", \"TSH\", \"T4F\"     No results found for: \"CHOLEST\", \"HDL\", \"TRIG\", \"LDL\", \"NONHDLC\"    No results found for: \"A1C\"   Vitamin D:    No results found for: \"VITD\"          ASSESSMENT AND PLAN:   1. Generalized abdominal pain  Labs ordered. Do these when he has symptoms.   Will need colonoscopy and egd given his symptoms.   Referral done for gi.   - Lipase; Future  - EVALUATE & TREAT, GASTRO (INTERNAL)  - CBC With Differential With Platelet; Future  - Comp Metabolic Panel (14); Future    2. Diarrhea, unspecified type  None currently.   Could be IBS vs other trigger.   See gi.     3. Rectal bleeding  Likely hemorrhoid but will need colonoscopy.   See gi.     4. Gerd  Discussed avoiding exacerbating foods.   Trial omeprazole 20mg daily to see if this helps.   See gi as planned.   May need egd.         Return in about 3 months (around 1/28/2025) for physical.      Rody Denis MD

## 2024-11-14 ENCOUNTER — OFFICE VISIT (OUTPATIENT)
Dept: INTERNAL MEDICINE CLINIC | Facility: CLINIC | Age: 39
End: 2024-11-14
Payer: COMMERCIAL

## 2024-11-14 VITALS
BODY MASS INDEX: 30.99 KG/M2 | OXYGEN SATURATION: 96 % | WEIGHT: 221.38 LBS | SYSTOLIC BLOOD PRESSURE: 114 MMHG | DIASTOLIC BLOOD PRESSURE: 70 MMHG | HEART RATE: 76 BPM | HEIGHT: 71 IN | TEMPERATURE: 97 F | RESPIRATION RATE: 16 BRPM

## 2024-11-14 DIAGNOSIS — T63.301A SPIDER BITE WOUND, ACCIDENTAL OR UNINTENTIONAL, INITIAL ENCOUNTER: Primary | ICD-10-CM

## 2024-11-14 PROCEDURE — 99213 OFFICE O/P EST LOW 20 MIN: CPT | Performed by: STUDENT IN AN ORGANIZED HEALTH CARE EDUCATION/TRAINING PROGRAM

## 2024-11-14 PROCEDURE — 3078F DIAST BP <80 MM HG: CPT | Performed by: STUDENT IN AN ORGANIZED HEALTH CARE EDUCATION/TRAINING PROGRAM

## 2024-11-14 PROCEDURE — 3074F SYST BP LT 130 MM HG: CPT | Performed by: STUDENT IN AN ORGANIZED HEALTH CARE EDUCATION/TRAINING PROGRAM

## 2024-11-14 PROCEDURE — 3008F BODY MASS INDEX DOCD: CPT | Performed by: STUDENT IN AN ORGANIZED HEALTH CARE EDUCATION/TRAINING PROGRAM

## 2024-11-14 RX ORDER — OMEGA-3 FATTY ACIDS/FISH OIL 300-1000MG
600 CAPSULE ORAL 2 TIMES DAILY
COMMUNITY
Start: 2024-11-14

## 2024-11-14 RX ORDER — SULFAMETHOXAZOLE AND TRIMETHOPRIM 800; 160 MG/1; MG/1
1 TABLET ORAL 2 TIMES DAILY
Qty: 10 TABLET | Refills: 0 | Status: SHIPPED | OUTPATIENT
Start: 2024-11-14

## 2024-11-14 NOTE — PROGRESS NOTES
OFFICE NOTE     Patient ID: Gerardo Stern is a 39 year old male.  Today's Date: 11/14/24  Chief Complaint: Bite Sting,Insect (Bit by a spider last weekend, bite is located under the right buttocks, states it is red, swollen and very painful especially when sitting. Not aware of any drainage coming out of the bite )    Patient comes in today for the evaluation of the bite.patient fell asleep at home on the couch in his basement and woke up with a painful red bump on his right lower buttock.  He thinks that this was a spider, however him never seen a spider in his home or basement.  He states that it is very painful and he is unable to sit on the chair.  He also reports redness and swelling in the area, however is not worsening since Saturday when it happened.  He denies any shortness of breath, other rash, itchy throat, fevers, chills or other symptoms.      Vitals:    11/14/24 0833   BP: 114/70   Pulse: 76   Resp: 16   Temp: 96.8 °F (36 °C)   TempSrc: Temporal   SpO2: 96%   Weight: 221 lb 6 oz (100.4 kg)   Height: 5' 11\" (1.803 m)     body mass index is 30.88 kg/m².  BP Readings from Last 3 Encounters:   11/14/24 114/70   10/28/24 110/72   08/11/24 128/83     The ASCVD Risk score (Bernadette MALCOLM, et al., 2019) failed to calculate for the following reasons:    Cannot find a previous HDL lab    Cannot find a previous total cholesterol lab      Medications reviewed:  Current Outpatient Medications   Medication Sig Dispense Refill    sulfamethoxazole-trimethoprim -160 MG Oral Tab per tablet Take 1 tablet by mouth 2 (two) times daily. 10 tablet 0    Ibuprofen (ADVIL) 200 MG Oral Cap Take 3 capsules (600 mg total) by mouth in the morning and 3 capsules (600 mg total) before bedtime.      Omeprazole 20 MG Oral Tab EC Take 1 tablet by mouth every morning. 30 tablet 1    oxyCODONE-acetaminophen 5-325 MG Oral Tab Take 1 tablet by mouth every 6 (six) hours as needed. (Patient not taking: Reported on 11/14/2024)       Hydrocortisone-Acetic Acid 1-2 % Otic Solution Place 4 drops into both ears 2 (two) times daily. (Patient not taking: Reported on 11/14/2024) 10 mL 1    Pimecrolimus (ELIDEL) 1 % External Cream Apply to affected area on perioral area BID (Patient not taking: Reported on 11/14/2024) 30 g 0    Flurandrenolide (CORDRAN) 0.05 % External Cream Apply to aa on the face bid for 1 month (Patient not taking: Reported on 11/14/2024) 15 g 0         Assessment & Plan    1. Spider bite wound, accidental or unintentional, initial encounter (Primary)  Patient's symptoms are consistent with cellulitis due to to an insect or spider bite.  The area appears indurated, painful to palpation, erythematous , however there is no fluctuance to suggest an abscess.  Patient will benefit from the short course of antibiotics to prevent spreading of the cellulitis and a course of NSAIDs to reduce inflammation.  -    Take Advil 600 mg twice daily for 5 days.  -     Sulfamethoxazole-Trimethoprim; Take 1 tablet by mouth 2 (two) times daily.  Dispense: 10 tablet; Refill: 0        Follow Up: As needed/if symptoms worsen     I spent 20 minutes obtaining pertitent medical history, reviewing pertinent imaging/labs and specialists notes, evaluating patient, discussing differential diagnosis' and various treatment options, reinforcing importance of compliance with treatment plan, and completing documentation.       Objective/ Results:   Physical Exam  Constitutional:       General: He is not in acute distress.     Appearance: Normal appearance. He is not ill-appearing, toxic-appearing or diaphoretic.   HENT:      Head: Normocephalic and atraumatic.   Eyes:      Extraocular Movements: Extraocular movements intact.      Conjunctiva/sclera: Conjunctivae normal.      Pupils: Pupils are equal, round, and reactive to light.   Cardiovascular:      Rate and Rhythm: Normal rate and regular rhythm.      Heart sounds: No murmur heard.     No friction rub.    Pulmonary:      Effort: Pulmonary effort is normal. No respiratory distress.      Breath sounds: Normal breath sounds. No stridor. No wheezing, rhonchi or rales.   Skin:     General: Skin is warm.      Capillary Refill: Capillary refill takes less than 2 seconds.      Comments: Skin findings as on the picture above.     Neurological:      General: No focal deficit present.      Mental Status: He is alert and oriented to person, place, and time.   Psychiatric:         Mood and Affect: Mood normal.         Behavior: Behavior normal.         Thought Content: Thought content normal.     Skin findings as on the picture above.      Reviewed:    There are no active problems to display for this patient.     Allergies[1]     Social History     Socioeconomic History    Marital status:     Number of children: 2   Occupational History    Occupation:    Tobacco Use    Smoking status: Never   Vaping Use    Vaping status: Never Used   Substance and Sexual Activity    Alcohol use: Not Currently     Comment: approx 0-6 every couple of months    Drug use: No    Sexual activity: Yes     Partners: Female     Comment:       Review of Systems   Constitutional:  Negative for activity change, appetite change, chills and fever.   HENT: Negative.     Eyes: Negative.    Respiratory:  Negative for chest tightness, shortness of breath and wheezing.    Cardiovascular: Negative.    Gastrointestinal: Negative.    Endocrine: Negative.    Genitourinary: Negative.    Skin:  Positive for color change, rash and wound.     All other systems negative unless otherwise stated in ROS or HPI above.       Modesta Zarate MD  Internal Medicine       Call office with any questions or seek emergency care if necessary.   Patient understands and agrees to follow directions and advice.      ----------------------------------------- PATIENT INSTRUCTIONS-----------------------------------------     There are no Patient Instructions on  file for this visit.        The 21st Century Cures Act makes medical notes available to patients in the interest of transparency.  However, please be advised that this is a medical document.  It is intended as a peer to peer communication.  It is written in medical language and may contain abbreviations or verbiage that are technical and unfamiliar.  It may appear blunt or direct.  Medical documents are intended to carry relevant information, facts as evident, and the clinical opinion of the practitioner.          [1] No Known Allergies

## 2025-03-21 ENCOUNTER — OFFICE VISIT (OUTPATIENT)
Dept: INTERNAL MEDICINE CLINIC | Facility: CLINIC | Age: 40
End: 2025-03-21
Payer: COMMERCIAL

## 2025-03-21 VITALS
SYSTOLIC BLOOD PRESSURE: 120 MMHG | HEIGHT: 70.75 IN | BODY MASS INDEX: 31.26 KG/M2 | WEIGHT: 223.31 LBS | TEMPERATURE: 97 F | HEART RATE: 84 BPM | DIASTOLIC BLOOD PRESSURE: 70 MMHG | RESPIRATION RATE: 16 BRPM

## 2025-03-21 DIAGNOSIS — Z00.00 PHYSICAL EXAM, ANNUAL: Primary | ICD-10-CM

## 2025-03-21 DIAGNOSIS — K21.9 GASTROESOPHAGEAL REFLUX DISEASE, UNSPECIFIED WHETHER ESOPHAGITIS PRESENT: ICD-10-CM

## 2025-03-21 NOTE — PROGRESS NOTES
[facility]    CHIEF COMPLAINT:   Chief Complaint   Patient presents with    Routine Physical     Reviewed Preventative/Wellness form with patient.            The following individual(s) verbally consented to be recorded using ambient AI listening technology and understand that they can each withdraw their consent to this listening technology at any point by asking the clinician to turn off or pause the recording:    Patient name: Gerardo Stern  Additional names:  none     HPI:   Gerardo Stern is a 40 year old male who presents for a complete physical exam.      Up to date tdap.   Get covid booster at his local pharmacy.     Exercise: walking    Derm: he will be seeing derm at [facility], his wife made him an appt for next week.       Labs needed, he did not do the labs that were ordered at last visit.     History of Present Illness  He has been experiencing abdominal pain, which led to a referral to a gastroenterologist. The specialist recommended an endoscopy and colonoscopy to investigate potential causes such as gastritis or other gastrointestinal issues.    He has been taking omeprazole, initially on an as-needed basis, but now takes it regularly. He has requested a refill for this medication and has been provided with a 90-day supply with a refill, totaling six months of medication.    No chest pain, shortness of breath, headaches, dizziness, or skin concerns such as changing moles. No penile lesions, ulcers, discharge, or bulges.    He engages in regular physical activity through his job, which involves a lot of walking and being on his feet most of the time.       Wt Readings from Last 6 Encounters:   03/21/25 223 lb 4.8 oz (101.3 kg)   03/18/25 224 lb 3.2 oz (101.7 kg)   11/14/24 221 lb 6 oz (100.4 kg)   10/28/24 215 lb 12.8 oz (97.9 kg)   08/11/24 220 lb (99.8 kg)   08/01/24 220 lb (99.8 kg)     Body mass index is 31.36 kg/m².       Current Outpatient Medications   Medication Sig Dispense  Refill    Omeprazole 20 MG Oral Tab EC Take 1 tablet by mouth every morning. 90 tablet 1    Multiple Vitamin (MULTIVITAMIN OR) Take by mouth daily.      MAGNESIUM CITRATE OR Take by mouth nightly.        Past Medical History:    COVID    Decorative tattoo    Right Arm    Heartburn    Indigestion      Past Surgical History:   Procedure Laterality Date    Other surgical history Right 2023    repair of torn supraspinatus  muscle    Vasectomy        Family History   Problem Relation Age of Onset    Osteoporosis Mother     Cancer Father 60        bladder and kidney    Other (Other) Maternal Grandmother         Alzheimer's    Diabetes Maternal Grandmother     Heart Disorder Maternal Grandfather     Diabetes Paternal Grandmother     Heart Disorder Paternal Grandfather       Social History:   Social History     Socioeconomic History    Marital status:     Number of children: 2   Occupational History    Occupation:    Tobacco Use    Smoking status: Never   Vaping Use    Vaping status: Never Used   Substance and Sexual Activity    Alcohol use: Yes     Comment: approx 0-5 drinks per month    Drug use: No    Sexual activity: Yes     Partners: Female     Comment:      Social Drivers of Health     Food Insecurity: No Food Insecurity (3/21/2025)    NCSS - Food Insecurity     Worried About Running Out of Food in the Last Year: No     Ran Out of Food in the Last Year: No   Transportation Needs: No Transportation Needs (3/21/2025)    NCSS - Transportation     Lack of Transportation: No   Housing Stability: Not At Risk (3/21/2025)    NCSS - Housing/Utilities     Has Housing: Yes     Worried About Losing Housing: No     Unable to Get Utilities: No     : yes.    Exercise: walking.  Diet: watches minimally     REVIEW OF SYSTEMS:   GENERAL: feels well otherwise  SKIN: denies any unusual skin lesions  EYES:denies blurred vision or double vision  HEENT: denies nasal congestion, sinus pain or ST  LUNGS:  denies shortness of breath with exertion  CARDIOVASCULAR: denies chest pain on exertion  GI: denies abdominal pain,denies heartburn  : denies dysuria  MUSCULOSKELETAL: denies back pain  NEURO: denies headaches  PSYCHE: denies depression or anxiety  HEMATOLOGIC: denies hx of anemia  ENDOCRINE: denies thyroid history  ALL/ASTHMA: denies hx of allergy or asthma  EXAM:   /70 (BP Location: Left arm, Patient Position: Sitting, Cuff Size: large)   Pulse 84   Temp 97.3 °F (36.3 °C) (Temporal)   Resp 16   Ht 5' 10.75\" (1.797 m)   Wt 223 lb 4.8 oz (101.3 kg)   BMI 31.36 kg/m²   Body mass index is 31.36 kg/m².   GENERAL: well developed, well nourished,in no apparent distress  SKIN: no rashes,no suspicious lesions  HEENT: atraumatic, normocephalic,ears and throat are clear  EYES:PERRLA, conjunctiva are clear  NECK: supple,no adenopathy,no bruits  CHEST: no chest tenderness  LUNGS: clear to auscultation  CARDIO: nl s1 and s2, RRR without murmur  GI: good BS's,no masses, HSM or tenderness  GENITAL/URINARY:  deferred. No symptoms.   MUSCULOSKELETAL: back is not tender,FROM of the back  EXTREMITIES: no cyanosis, clubbing or edema  NEURO: Oriented times three,cranial nerves are intact,motor and sensory are grossly intact  Labs:   No results found for: \"WBC\", \"HGB\", \"PLT\"   No results found for: \"GLU\", \"NA\", \"K\", \"CL\", \"CO2\", \"CREATSERUM\", \"CA\", \"ALB\", \"TP\", \"ALKPHO\", \"AST\", \"ALT\", \"BILT\", \"TSH\", \"T4F\"     No results found for: \"CHOLEST\", \"HDL\", \"TRIG\", \"LDL\", \"NONHDLC\"    No results found for: \"A1C\"   Vitamin D:    No results found for: \"VITD\"        ASSESSMENT AND PLAN:   Gerardo Stern is a 40 year old male who presents for a complete physical exam.   1. Physical exam, annual  Do labs.   Immunizations discussed.   Continue regular exercise.   - CBC With Differential With Platelet; Future  - Comp Metabolic Panel; Future  - Lipid Panel; Future  - TSH W Reflex To Free T4; Future    2. Gastroesophageal reflux disease,  unspecified whether esophagitis present  Refill done for omeprazole. Do egd and colonoscopy as recommended. If needs to stay on it beyond 6 months he should get rx from gi. He verbalized understanding.         Return in about 1 year (around 3/21/2026) for physical.      Rody Denis MD

## 2025-04-23 PROBLEM — R19.4 CHANGE IN BOWEL HABITS: Status: ACTIVE | Noted: 2025-04-23

## 2025-04-23 PROBLEM — K92.1 HEMATOCHEZIA: Status: ACTIVE | Noted: 2025-04-23

## 2025-04-23 PROBLEM — K21.9 GASTROESOPHAGEAL REFLUX DISEASE: Status: ACTIVE | Noted: 2025-04-23

## 2025-04-23 PROBLEM — R13.10 DYSPHAGIA, UNSPECIFIED: Status: ACTIVE | Noted: 2025-04-23

## 2025-05-12 NOTE — TELEPHONE ENCOUNTER
Last OV relevant to medication: 3/21  Last refill date: 3/21     #/refills: 6 mo- should still be good  When pt was asked to return for OV: 1 y  Upcoming appt/reason:   Was pt informed of any over due labs: over due

## (undated) NOTE — LETTER
Date & Time: 11/27/2023, 12:44 PM  Patient: Telma Rasmussen  Encounter Provider(s):    Xochilt Maradiaga PA-C       To Whom It May Concern:    Julisa Garcia was seen and treated in our department on 11/27/2023. He tested positive for covid. He should not return to work until cleared from quarantine guidelines (day 5 is 12/1, must be fever free for 24 hours with improvement in symptoms) .     If you have any questions or concerns, please do not hesitate to call.        _____________________________  Physician/APC Signature

## (undated) NOTE — LETTER
Date & Time: 4/10/2024, 4:02 PM  Patient: Gerardo Stern  Encounter Provider(s):    Carmelita Hernandez APRN       To Whom It May Concern:    Gerardo Stern was seen and treated in our department on 4/10/2024. He can return to work after being fever free for 24 hours..    If you have any questions or concerns, please do not hesitate to call.        _____________________________  Physician/APC Signature

## (undated) NOTE — LETTER
Date & Time: 8/11/2024, 2:06 PM  Patient: Gerardo Stern  Encounter Provider(s):    Halley Vargas PA-C       To Whom It May Concern:    Gerardo Stern was seen and treated in our department on 8/11/2024. He can return to work when symptoms improve.    If you have any questions or concerns, please do not hesitate to call.        _____________________________  Physician/APC Signature